# Patient Record
Sex: FEMALE | Race: WHITE | NOT HISPANIC OR LATINO | URBAN - METROPOLITAN AREA
[De-identification: names, ages, dates, MRNs, and addresses within clinical notes are randomized per-mention and may not be internally consistent; named-entity substitution may affect disease eponyms.]

---

## 2023-06-10 DIAGNOSIS — I10 UNCONTROLLED HYPERTENSION: ICD-10-CM

## 2023-06-13 RX ORDER — CHLORTHALIDONE 25 MG/1
25 TABLET ORAL DAILY
Qty: 30 TABLET | Refills: 0 | Status: SHIPPED | OUTPATIENT
Start: 2023-06-13 | End: 2023-06-20

## 2023-06-19 DIAGNOSIS — I10 UNCONTROLLED HYPERTENSION: ICD-10-CM

## 2023-06-20 RX ORDER — CHLORTHALIDONE 25 MG/1
25 TABLET ORAL DAILY
Qty: 30 TABLET | Refills: 0 | Status: SHIPPED | OUTPATIENT
Start: 2023-06-20

## 2024-08-29 ENCOUNTER — APPOINTMENT (OUTPATIENT)
Dept: OPHTHALMOLOGY | Facility: CLINIC | Age: 64
End: 2024-08-29
Payer: COMMERCIAL

## 2024-08-29 DIAGNOSIS — H52.4 PRESBYOPIA: ICD-10-CM

## 2024-08-29 DIAGNOSIS — E11.9 CONTROLLED TYPE 2 DIABETES MELLITUS WITHOUT COMPLICATION, UNSPECIFIED WHETHER LONG TERM INSULIN USE (MULTI): Primary | ICD-10-CM

## 2024-08-29 DIAGNOSIS — H52.03 HYPEROPIA, BILATERAL: ICD-10-CM

## 2024-08-29 PROCEDURE — 99213 OFFICE O/P EST LOW 20 MIN: CPT | Performed by: OPHTHALMOLOGY

## 2024-08-29 ASSESSMENT — SLIT LAMP EXAM - LIDS
COMMENTS: GOOD POSITION
COMMENTS: GOOD POSITION

## 2024-08-29 ASSESSMENT — CONF VISUAL FIELD
OS_INFERIOR_TEMPORAL_RESTRICTION: 0
OD_SUPERIOR_TEMPORAL_RESTRICTION: 0
OD_INFERIOR_TEMPORAL_RESTRICTION: 0
OD_SUPERIOR_NASAL_RESTRICTION: 0
OS_INFERIOR_NASAL_RESTRICTION: 0
OS_SUPERIOR_TEMPORAL_RESTRICTION: 0
OD_INFERIOR_NASAL_RESTRICTION: 0
OS_NORMAL: 1
OD_NORMAL: 1
OS_SUPERIOR_NASAL_RESTRICTION: 0
METHOD: COUNTING FINGERS

## 2024-08-29 ASSESSMENT — REFRACTION_MANIFEST
OD_SPHERE: +1.75
OS_ADD: +2.50
OD_CYLINDER: -0.50
OS_CYLINDER: SPHER
OD_AXIS: 100
OS_SPHERE: +2.00
OD_ADD: +2.50

## 2024-08-29 ASSESSMENT — REFRACTION_WEARINGRX
OD_AXIS: 100
OS_CYLINDER: SPHER
SPECS_TYPE: SVL
OS_SPHERE: +2.00
OS_ADD: +2.50
OD_ADD: +2.50
OD_SPHERE: +1.25
OD_CYLINDER: -0.50

## 2024-08-29 ASSESSMENT — VISUAL ACUITY
CORRECTION_TYPE: GLASSES
OS_CC: 20/20
METHOD: SNELLEN - LINEAR
OD_CC: 20/20

## 2024-08-29 ASSESSMENT — ENCOUNTER SYMPTOMS
PSYCHIATRIC NEGATIVE: 0
EYES NEGATIVE: 0
HEMATOLOGIC/LYMPHATIC NEGATIVE: 0
MUSCULOSKELETAL NEGATIVE: 0
GASTROINTESTINAL NEGATIVE: 0
ENDOCRINE NEGATIVE: 0
NEUROLOGICAL NEGATIVE: 0
RESPIRATORY NEGATIVE: 0
ALLERGIC/IMMUNOLOGIC NEGATIVE: 0
CARDIOVASCULAR NEGATIVE: 0
CONSTITUTIONAL NEGATIVE: 0

## 2024-08-29 ASSESSMENT — EXTERNAL EXAM - RIGHT EYE: OD_EXAM: NORMAL

## 2024-08-29 ASSESSMENT — TONOMETRY
OS_IOP_MMHG: 19
OD_IOP_MMHG: 19
IOP_METHOD: GOLDMANN APPLANATION

## 2024-08-29 ASSESSMENT — EXTERNAL EXAM - LEFT EYE: OS_EXAM: NORMAL

## 2024-08-29 NOTE — PROGRESS NOTES
Assessment/Plan   Diagnoses and all orders for this visit:  Controlled type 2 diabetes mellitus without complication, unspecified whether long term insulin use (Multi)  no retinopathy observed on exam today od/os, pt ed to continue good BGlc, blood pressure and lipid control, rtc with any changes in vision, otherwise monitor 1 year   Hyperopia, bilateral  Presbyopia  Glasses prescription given to patient today

## 2024-11-25 ENCOUNTER — HOSPITAL ENCOUNTER (OUTPATIENT)
Dept: RADIOLOGY | Facility: HOSPITAL | Age: 64
Discharge: HOME | End: 2024-11-25
Payer: COMMERCIAL

## 2024-11-25 DIAGNOSIS — R94.31 ABNORMAL ELECTROCARDIOGRAM (ECG) (EKG): ICD-10-CM

## 2024-11-25 PROCEDURE — 75571 CT HRT W/O DYE W/CA TEST: CPT

## 2024-11-27 ENCOUNTER — EVALUATION (OUTPATIENT)
Dept: PHYSICAL THERAPY | Facility: CLINIC | Age: 64
End: 2024-11-27
Payer: COMMERCIAL

## 2024-11-27 DIAGNOSIS — R53.81 OTHER MALAISE: ICD-10-CM

## 2024-11-27 DIAGNOSIS — M25.552 PAIN OF LEFT HIP: Primary | ICD-10-CM

## 2024-11-27 PROCEDURE — 97161 PT EVAL LOW COMPLEX 20 MIN: CPT | Mod: GP

## 2024-11-27 PROCEDURE — 97110 THERAPEUTIC EXERCISES: CPT | Mod: GP

## 2024-11-27 PROCEDURE — 97535 SELF CARE MNGMENT TRAINING: CPT | Mod: GP

## 2024-11-27 ASSESSMENT — ENCOUNTER SYMPTOMS
DEPRESSION: 0
LOSS OF SENSATION IN FEET: 0
OCCASIONAL FEELINGS OF UNSTEADINESS: 1

## 2024-11-27 NOTE — LETTER
November 27, 2024    Goldie Alston MD  [address] [facility] [address]  [facility] [address]    Patient: Kenia Velez   YOB: 1960   Date of Visit: 11/27/2024       Dear Goldie Alston MD  [address] [id number]    The attached plan of care is being sent to you because your patient’s medical reimbursement requires that you certify the plan of care. Your signature is required to allow uninterrupted insurance coverage.      You may indicate your approval by signing below and faxing this form back to us at Dept Fax: [phone].    Please call Dept: [phone] with any questions or concerns.    Thank you for this referral,        Elisa Levine, PT  [facility] [place] [place] [address] [facility]  [id number] [place] [id number]    Payer: Payor: JAZMIN / Plan: ANTHEM TRADITIONAL / Product Type: *No Product type* /                                                                         Date:     Dear Elisa Levine, PT,     Re: Ms. Kenia Velez, MRN:[id number]    I certify that I have reviewed the attached plan of care and it is medically necessary for Ms. Kenia Velez (1960) who is under my care.          ______________________________________                    _________________  Provider name and credentials                                           Date and time                                                                                           Plan of Care 11/27/24   Effective from: 11/27/2024  Effective to: 2/25/2025    Plan ID: [id number]            Participants as of Finalize on 11/27/2024    Name Type Comments Contact Info    Goldie Alston MD Referring Provider  [phone]       Last Plan Note     Author: Elisa Levine PT Status: Incomplete Last edited: 11/27/2024  3:30 PM         Physical Therapy  Physical Therapy Orthopedic Evaluation    Patient Name: Kenia  Agustin  MRN: [id number]  Today's Date: 11/27/2024  Time Calculation  Start Time: 1503  Stop Time: 1604  Time Calculation (min): 61 min    Insurance:  Visit number: 1 of 50  Authorization info: No Auth  Insurance Type: [place]  Onset date: 11/1/2022    General:  Reason for visit: Left Hip   Referred by: Goldie Alston MD    Current Problem:  1. Pain of left hip  Follow Up In Physical Therapy      2. Other malaise  Referral to Physical Therapy          Precautions:   Precautions  STEADI Fall Risk Score (The score of 4 or more indicates an increased risk of falling): 5      Medical History Form: Reviewed (scanned into chart)    Subjective:   Subjective  Chief Complaint: Patient presents to clinic with chronic L hip pain and advanced OA that has notably worsened over the past 2 years.  Had 2 prior PT episodes (1 1/2 years ago and spring of 2024).  PT was temporarily helpful.  Has never had aquatic physical therapy. Has been considering a total hip replacement, but visit with orthopedist at [facility] isn't until April 2025.  WHITLEY: Insidious    Current Condition:   Worse    Pain:  Pain level currently: 1/10  Pain level at worst: 8-9/10  Pain level at best: 0-1/10  Location: L hip  Description: Stiffness, shooting stabbing pain  Aggravating Factors: Prolonged standing, quick LE movement, prolonged walking and standing  Relieving Factors:  Use of cane    Relevant Information:  PMH: Diabetes, HTN, OA  Previous Tests/Imaging: Xray 2023: Advanced bone on bone L hip OA  Previous Interventions/Treatments: Physical Therapy    Prior Level of Function (PLOF)  Patient previously independent with all ADLs. Currently at 60% of PLOF.  Functional limitations: Altered gait, ambulates with a cane, sit-stand (UE assist), impaired balance with getting in/out of bathtub, difficulty on stairs; lifting/carrying; difficulty donning/doffing shoes and socks   Exercise/Physical Activity: None currently  Work/School:     Patients Living  "Environment: Reviewed and no concern    Primary Language: English    Patient's Goal(s) for Therapy: Build strength    Red Flags: Do you have any of the following? No  Fever/chills, unexplained weight changes, dizziness/fainting, unexplained change in bowel or bladder functions, unexplained malaise or muscle weakness, night pain/sweats, numbness or tingling    Objective:  Objective    Posture: Stands with L hip flexed;  Right iliac crest elevated vs left  ,   Gait: Ambulates with standard cane    Observation: Pain with lowering L hip out of flexed position in supine.     ROM    Lumbar AROM (%)    Flexion: 75%, L knee pain  Extension: 25%, LBP  (R) Side Bend: 50%  (L) Side Bend: 50%  (R) Rotation: 75%  (L) Rotation: 75%      Hip AROM (Degrees)       (R)  (L)  Flexion:  101  90, pain   Extension:  5  -15, pain    Abduction:  27, LBP  8, pain      ER:   47  21     IR:   26  19        Hip PROM (Degrees)         (L)  Flexion:    90     Extension:    -15        Knee AROM (Degrees)       (R)  (L)  Flexion:  127  130   Extension:  -5  0       Strength Testing    Hip     (R)  (L)  Flexion:  4+  4     Extension:  Reduced lift with bridge      Knee     (R)  (L)  Flexion:  5  4+     Extension:  5  5     Core Control:  Abdominals: Fair  Pelvic Bridge: Poor lift height         Flexibility       (R)  (L)  Hamstrings:  Mod carrion Mod carrion  Hip Flexors:  Mod carrion Major carrion, hip flexion contracture  Quadriceps:  NT  NT       Functional Movement  Sit to stand: UE assist  Bed Mobility: Independent       Balance    Single Leg Balance:  (R)13\"  (L)9\"  Tandem Balance: (R)Decreased control (L)Decreased control      Neuro:    Dermatomes: Intact to light touch    Special Tests  Radicular Symptoms: (-)  Joint Mobility: L hip joint hypomobility  Leg Length Discrepancy: Apparent LLD due to hip flexion contracture L  [place]'s Sign (+)      Outcome Measures:  Other Measures  Lower Extremity Funtional Score (LEFS): 35/80       Goals: Set and discussed " today  Active       PT Problem       PT Goal 1       Start:  11/27/24    Expected End:  01/08/25       Independent with home exercise program for symptom reduction and functional gains.           PT Goal 2       Start:  11/27/24    Expected End:  02/05/25       Left hip extension improved to at least neutral to allow for more upright standing and improved functional mobility.    Left hip flexion and external rotation AROM improved by at least 20 degrees for increased ease with donning and doffing socks and shoes.  Glut strength improved to at least 4/5 for improved transfers and functional mobility.    Patient will rate pain < 6/10 at worst to improve ADL tolerance.   LEFS score improved by at least 8 points.              Plan of care was developed with input and agreement by the patient    Treatment Performed:    Therapeutic Exercise:    15 min  Instructed in personalized Home Exercise program  Access Code: [email or web address]  URL: [email or web address]  Date: 11/27/2024  Prepared by: Elisa Levine    Exercises  - Supine Posterior Pelvic Tilt  - 1 x daily - 7 x weekly - 20 reps - 5 seconds hold  - Hip Flexion  - 2 x daily - 7 x weekly - 20 reps  - Supine Gluteal Sets  - 2 x daily - 7 x weekly - 20 reps - 5 seconds hold  - Bent Knee Fallouts  - 2 x daily - 7 x weekly - 20 reps  - Supine Hip Abduction  - 2 x daily - 7 x weekly - 20 reps  - Hooklying Single Knee to Chest Stretch  - 2 x daily - 7 x weekly - 3 reps - 30 seconds hold  - Supine Bridge  - 1 x daily - 7 x weekly - 3 sets - 10 reps - 5 seconds hold    Self Care/Education:     15 min  Evaluation findings  Plan of care  Posture  Provided patient with scheduling phone numbers for  orthopedic physicians (Satya Meneses [place])  Oriented to pool area and discussed the benefits of aquatic therapy          Charges: LC Eval x 1, TE x 1    Assessment: Patient presents to PT with signs and symptoms consistent with left hip OA.  Upon  examination, patient demonstrates severely limited L hip ROM with hip flexion contracture, reduced strength in core and hip musculature, limited flexibility, and impaired gait.  Functional limitations include difficulty with prolonged walking, standing, stairs, donning/doffing of shoes, sit-stand, and lifting.  Recommend course of PT, beginning with aquatics, to address these impairments, reduce pain, and improve function.         Clinical Presentation: Stable and/or uncomplicated characteristics  Level of Complexity: Low    Plan:     Planned Interventions include: therapeutic exercise, self-care home management, manual therapy, therapeutic activities, gait training, neuromuscular coordination, aquatic therapy  Frequency: 1 x Week  Duration: 8 Weeks  Rehab Potential/Prognosis: Gumaro Levine, PT          Current Participants as of 11/27/2024    Name Type Comments Contact Info    Goldie Alston MD Referring Provider  [phone]    Signature pending

## 2024-11-27 NOTE — PROGRESS NOTES
Physical Therapy  Physical Therapy Orthopedic Evaluation    Patient Name: Kenia Velez  MRN: [id number]  Today's Date: 11/27/2024  Time Calculation  Start Time: 1503  Stop Time: 1604  Time Calculation (min): 61 min    Insurance:  Visit number: 1 of 50  Authorization info: No Auth  Insurance Type: [place]  Onset date: 11/1/2022    General:  Reason for visit: Left Hip   Referred by: Goldie Alston MD    Current Problem:  1. Pain of left hip  Follow Up In Physical Therapy      2. Other malaise  Referral to Physical Therapy          Precautions:   Precautions  STEADI Fall Risk Score (The score of 4 or more indicates an increased risk of falling): 5      Medical History Form: Reviewed (scanned into chart)    Subjective:   Subjective   Chief Complaint: Patient presents to clinic with chronic L hip pain and advanced OA that has notably worsened over the past 2 years.  Had 2 prior PT episodes (1 1/2 years ago and spring of 2024).  PT was temporarily helpful.  Has never had aquatic physical therapy. Has been considering a total hip replacement, but visit with orthopedist at [facility] isn't until April 2025.  WHITLEY: Insidious    Current Condition:   Worse    Pain:  Pain level currently: 1/10  Pain level at worst: 8-9/10  Pain level at best: 0-1/10  Location: L hip  Description: Stiffness, shooting stabbing pain  Aggravating Factors: Prolonged standing, quick LE movement, prolonged walking and standing  Relieving Factors:  Use of cane    Relevant Information:  PMH: Diabetes, HTN, OA  Previous Tests/Imaging: Xray 2023: Advanced bone on bone L hip OA  Previous Interventions/Treatments: Physical Therapy    Prior Level of Function (PLOF)  Patient previously independent with all ADLs. Currently at 60% of PLOF.  Functional limitations: Altered gait, ambulates with a cane, sit-stand (UE assist), impaired balance with getting in/out of bathtub, difficulty on stairs; lifting/carrying; difficulty donning/doffing shoes and socks   Exercise/Physical  "Activity: None currently  Work/School:     Patients Living Environment: Reviewed and no concern    Primary Language: English    Patient's Goal(s) for Therapy: Build strength    Red Flags: Do you have any of the following? No  Fever/chills, unexplained weight changes, dizziness/fainting, unexplained change in bowel or bladder functions, unexplained malaise or muscle weakness, night pain/sweats, numbness or tingling    Objective:  Objective     Posture: Stands with L hip flexed;  Right iliac crest elevated vs left  ,   Gait: Ambulates with standard cane    Observation: Pain with lowering L hip out of flexed position in supine.     ROM    Lumbar AROM (%)    Flexion: 75%, L knee pain  Extension: 25%, LBP  (R) Side Bend: 50%  (L) Side Bend: 50%  (R) Rotation: 75%  (L) Rotation: 75%      Hip AROM (Degrees)       (R)  (L)  Flexion:  101  90, pain   Extension:  5  -15, pain    Abduction:  27, LBP  8, pain      ER:   47  21     IR:   26  19        Hip PROM (Degrees)         (L)  Flexion:    90     Extension:    -15        Knee AROM (Degrees)       (R)  (L)  Flexion:  127  130   Extension:  -5  0       Strength Testing    Hip     (R)  (L)  Flexion:  4+  4     Extension:  Reduced lift with bridge      Knee     (R)  (L)  Flexion:  5  4+     Extension:  5  5     Core Control:  Abdominals: Fair  Pelvic Bridge: Poor lift height         Flexibility       (R)  (L)  Hamstrings:  Mod carrion Mod carrion  Hip Flexors:  Mod carrion Major carrion, hip flexion contracture  Quadriceps:  NT  NT       Functional Movement  Sit to stand: UE assist  Bed Mobility: Independent       Balance    Single Leg Balance:  (R)13\"  (L)9\"  Tandem Balance: (R)Decreased control (L)Decreased control      Neuro:    Dermatomes: Intact to light touch    Special Tests  Radicular Symptoms: (-)  Joint Mobility: L hip joint hypomobility  Leg Length Discrepancy: Apparent LLD due to hip flexion contracture L  [place]'s Sign (+)      Outcome Measures:  Other Measures  Lower " Extremity Funtional Score (LEFS): 35/80       Goals: Set and discussed today  Active       PT Problem       PT Goal 1       Start:  11/27/24    Expected End:  01/08/25       Independent with home exercise program for symptom reduction and functional gains.           PT Goal 2       Start:  11/27/24    Expected End:  02/05/25       Left hip extension improved to at least neutral to allow for more upright standing and improved functional mobility.    Left hip flexion and external rotation AROM improved by at least 20 degrees for increased ease with donning and doffing socks and shoes.  Glut strength improved to at least 4/5 for improved transfers and functional mobility.    Patient will rate pain < 6/10 at worst to improve ADL tolerance.   LEFS score improved by at least 8 points.              Plan of care was developed with input and agreement by the patient    Treatment Performed:    Therapeutic Exercise:    15 min  Instructed in personalized Home Exercise program  Access Code: [email or web address]  URL: [email or web address]  Date: 11/27/2024  Prepared by: Elisa Levine    Exercises  - Supine Posterior Pelvic Tilt  - 1 x daily - 7 x weekly - 20 reps - 5 seconds hold  - Hip Flexion  - 2 x daily - 7 x weekly - 20 reps  - Supine Gluteal Sets  - 2 x daily - 7 x weekly - 20 reps - 5 seconds hold  - Bent Knee Fallouts  - 2 x daily - 7 x weekly - 20 reps  - Supine Hip Abduction  - 2 x daily - 7 x weekly - 20 reps  - Hooklying Single Knee to Chest Stretch  - 2 x daily - 7 x weekly - 3 reps - 30 seconds hold  - Supine Bridge  - 1 x daily - 7 x weekly - 3 sets - 10 reps - 5 seconds hold    Self Care/Education:     15 min  Evaluation findings  Plan of care  Posture  Provided patient with scheduling phone numbers for  orthopedic physicians (Satya Meneses, [place])  Oriented to pool area and discussed the benefits of aquatic therapy          Charges: LC Eval x 1, TE x 1    Assessment: Patient presents  to PT with signs and symptoms consistent with left hip OA.  Upon examination, patient demonstrates severely limited L hip ROM with hip flexion contracture, reduced strength in core and hip musculature, limited flexibility, and impaired gait.  Functional limitations include difficulty with prolonged walking, standing, stairs, donning/doffing of shoes, sit-stand, and lifting.  Recommend course of PT, beginning with aquatics, to address these impairments, reduce pain, and improve function.         Clinical Presentation: Stable and/or uncomplicated characteristics  Level of Complexity: Low    Plan:     Planned Interventions include: therapeutic exercise, self-care home management, manual therapy, therapeutic activities, gait training, neuromuscular coordination, aquatic therapy  Frequency: 1 x Week  Duration: 10 weeks  Rehab Potential/Prognosis: Gumaro Levine, PT

## 2024-12-04 ENCOUNTER — APPOINTMENT (OUTPATIENT)
Dept: PHYSICAL THERAPY | Facility: CLINIC | Age: 64
End: 2024-12-04
Payer: COMMERCIAL

## 2024-12-11 ENCOUNTER — APPOINTMENT (OUTPATIENT)
Dept: PHYSICAL THERAPY | Facility: CLINIC | Age: 64
End: 2024-12-11
Payer: COMMERCIAL

## 2024-12-19 ENCOUNTER — APPOINTMENT (OUTPATIENT)
Dept: PHYSICAL THERAPY | Facility: CLINIC | Age: 64
End: 2024-12-19
Payer: COMMERCIAL

## 2024-12-31 ENCOUNTER — APPOINTMENT (OUTPATIENT)
Dept: PHYSICAL THERAPY | Facility: CLINIC | Age: 64
End: 2024-12-31
Payer: COMMERCIAL

## 2025-01-08 ENCOUNTER — APPOINTMENT (OUTPATIENT)
Dept: PHYSICAL THERAPY | Facility: CLINIC | Age: 65
End: 2025-01-08
Payer: COMMERCIAL

## 2025-01-15 ENCOUNTER — APPOINTMENT (OUTPATIENT)
Dept: PHYSICAL THERAPY | Facility: CLINIC | Age: 65
End: 2025-01-15
Payer: COMMERCIAL

## 2025-09-04 ENCOUNTER — APPOINTMENT (OUTPATIENT)
Dept: OPHTHALMOLOGY | Facility: CLINIC | Age: 65
End: 2025-09-04
Payer: COMMERCIAL

## 2025-09-04 ASSESSMENT — ENCOUNTER SYMPTOMS
EYES NEGATIVE: 1
HEMATOLOGIC/LYMPHATIC NEGATIVE: 0
GASTROINTESTINAL NEGATIVE: 0
CONSTITUTIONAL NEGATIVE: 0
PSYCHIATRIC NEGATIVE: 0
ENDOCRINE NEGATIVE: 1
MUSCULOSKELETAL NEGATIVE: 0
ALLERGIC/IMMUNOLOGIC NEGATIVE: 0
CARDIOVASCULAR NEGATIVE: 0
NEUROLOGICAL NEGATIVE: 0
RESPIRATORY NEGATIVE: 0

## 2025-09-04 ASSESSMENT — REFRACTION_MANIFEST
OD_AXIS: 100
OS_SPHERE: +2.00
OD_SPHERE: +1.50
OS_ADD: +2.50
OS_CYLINDER: SPHERE
OD_CYLINDER: -0.50
OD_ADD: +2.50

## 2025-09-04 ASSESSMENT — REFRACTION_WEARINGRX
OD_SPHERE: +1.25
OS_ADD: +2.50
OD_AXIS: 100
OS_CYLINDER: SPHER
SPECS_TYPE: SVL
OD_CYLINDER: -0.50
OD_ADD: +2.50
OS_SPHERE: +2.00

## 2025-09-04 ASSESSMENT — CONF VISUAL FIELD
OS_NORMAL: 1
OS_INFERIOR_NASAL_RESTRICTION: 0
OD_NORMAL: 1
OD_INFERIOR_TEMPORAL_RESTRICTION: 0
OD_SUPERIOR_NASAL_RESTRICTION: 0
OS_SUPERIOR_TEMPORAL_RESTRICTION: 0
OS_INFERIOR_TEMPORAL_RESTRICTION: 0
OD_INFERIOR_NASAL_RESTRICTION: 0
OD_SUPERIOR_TEMPORAL_RESTRICTION: 0
OS_SUPERIOR_NASAL_RESTRICTION: 0

## 2025-09-04 ASSESSMENT — TONOMETRY
IOP_METHOD: GOLDMANN APPLANATION
OS_IOP_MMHG: 16
OD_IOP_MMHG: 15

## 2025-09-04 ASSESSMENT — SLIT LAMP EXAM - LIDS
COMMENTS: GOOD POSITION
COMMENTS: GOOD POSITION

## 2025-09-04 ASSESSMENT — CUP TO DISC RATIO
OS_RATIO: 0.3
OD_RATIO: 0.3

## 2025-09-04 ASSESSMENT — VISUAL ACUITY
OD_CC: 20/20
OS_CC: 20/20
METHOD: SNELLEN - LINEAR

## 2025-09-04 ASSESSMENT — EXTERNAL EXAM - LEFT EYE: OS_EXAM: NORMAL

## 2025-09-04 ASSESSMENT — EXTERNAL EXAM - RIGHT EYE: OD_EXAM: NORMAL

## 2026-09-15 ENCOUNTER — APPOINTMENT (OUTPATIENT)
Dept: OPHTHALMOLOGY | Facility: CLINIC | Age: 66
End: 2026-09-15
Payer: COMMERCIAL